# Patient Record
Sex: MALE | Race: WHITE | Employment: FULL TIME | URBAN - METROPOLITAN AREA
[De-identification: names, ages, dates, MRNs, and addresses within clinical notes are randomized per-mention and may not be internally consistent; named-entity substitution may affect disease eponyms.]

---

## 2017-08-16 ENCOUNTER — OFFICE VISIT (OUTPATIENT)
Dept: FAMILY MEDICINE | Facility: CLINIC | Age: 37
End: 2017-08-16
Payer: COMMERCIAL

## 2017-08-16 VITALS
BODY MASS INDEX: 26.99 KG/M2 | HEART RATE: 62 BPM | OXYGEN SATURATION: 100 % | DIASTOLIC BLOOD PRESSURE: 60 MMHG | HEIGHT: 65 IN | TEMPERATURE: 98.5 F | SYSTOLIC BLOOD PRESSURE: 99 MMHG | WEIGHT: 162 LBS

## 2017-08-16 DIAGNOSIS — M75.111 INCOMPLETE ROTATOR CUFF TEAR OR RUPTURE OF RIGHT SHOULDER, NOT SPECIFIED AS TRAUMATIC: ICD-10-CM

## 2017-08-16 DIAGNOSIS — E78.5 HYPERLIPIDEMIA LDL GOAL <100: Primary | ICD-10-CM

## 2017-08-16 PROCEDURE — 99395 PREV VISIT EST AGE 18-39: CPT | Performed by: INTERNAL MEDICINE

## 2017-08-16 NOTE — PROGRESS NOTES
SUBJECTIVE:   CC: Claudio Ramirez is an 37 year old male who presents for preventative health visit.     Physical   Annual:     Getting at least 3 servings of Calcium per day::  Yes    Bi-annual eye exam::  NO    Dental care twice a year::  Yes    Sleep apnea or symptoms of sleep apnea::  None    Taking medications regularly::  Yes    Medication side effects::  None    Additional concerns today::  No            Right shoulder pain x 2 months   New issues with shoulder .. initiaally happened years ago   Doing more work on the computer   Posterior shoudler and to the neck.   More than 6 months   Constant and painful   Working with rehab and therapy   Feels like tearing when throwing.  Used to play baseball   After practice got warm feeling in the area   When cold           Today's PHQ-2 Score:   PHQ-2 ( 1999 Pf[employer]) 8/16/2017   Q1: Little interest or pleasure in doing things 0   Q2: Feeling down, depressed or hopeless 0   PHQ-2 Score 0   Q1: Little interest or pleasure in doing things Not at all   Q2: Feeling down, depressed or hopeless Not at all   PHQ-2 Score 0       Abuse: Current or Past(Physical, Sexual or Emotional)- No  Do you feel safe in your environment - Yes    Social History   Substance Use Topics     Smoking status: Never Smoker     Smokeless tobacco: Never Used     Alcohol use Yes      Comment: 1 beer per week      The patient does not drink >3 drinks per day nor >7 drinks per week.    Last PSA: No results found for: PSA    Reviewed orders with patient. Reviewed health maintenance and updated orders accordingly - Yes  Labs reviewed in EPIC  BP Readings from Last 3 Encounters:   08/16/17 99/60   02/12/16 123/69    Wt Readings from Last 3 Encounters:   08/16/17 162 lb (73.5 kg)   02/12/16 165 lb (74.8 kg)                  Patient Active Problem List   Diagnosis     NO ACTIVE PROBLEMS     Past Surgical History:   Procedure Laterality Date     HC TOOTH EXTRACTION W/FORCEP         Social History  "  Substance Use Topics     Smoking status: Never Smoker     Smokeless tobacco: Never Used     Alcohol use Yes      Comment: 1 beer per week      Family History   Problem Relation Age of Onset     DIABETES Mother          No current outpatient prescriptions on file.           Reviewed and updated as needed this visit by clinical staffTobacco  Allergies  Med Hx  Surg Hx  Fam Hx  Soc Hx        Reviewed and updated as needed this visit by Provider        Past Medical History:   Diagnosis Date     NO ACTIVE PROBLEMS         ROS:  C: NEGATIVE for fever, chills, change in weight  I: NEGATIVE for worrisome rashes, moles or lesions  E: NEGATIVE for vision changes or irritation  ENT: NEGATIVE for ear, mouth and throat problems  R: NEGATIVE for significant cough or SOB  CV: NEGATIVE for chest pain, palpitations or peripheral edema  GI: NEGATIVE for nausea, abdominal pain, heartburn, or change in bowel habits   male: negative for dysuria, hematuria, decreased urinary stream, erectile dysfunction, urethral discharge  M: NEGATIVE for significant arthralgias or myalgia  N: NEGATIVE for weakness, dizziness or paresthesias  P: NEGATIVE for changes in mood or affect    OBJECTIVE:   BP 99/60 (BP Location: Right arm, Patient Position: Chair, Cuff Size: Adult Regular)  Pulse 62  Temp 98.5  F (36.9  C) (Oral)  Ht 5' 5\" (1.651 m)  Wt 162 lb (73.5 kg)  SpO2 100%  BMI 26.96 kg/m2    EXAM:  GENERAL: healthy, alert and no distress  NECK: no adenopathy, no asymmetry, masses, or scars and thyroid normal to palpation  RESP: lungs clear to auscultation - no rales, rhonchi or wheezes  CV: regular rate and rhythm, normal S1 S2, no S3 or S4, no murmur, click or rub, no peripheral edema and peripheral pulses strong  ABDOMEN: soft, nontender, no hepatosplenomegaly, no masses and bowel sounds normal  MS: right shoulder painful but not weak on isolation of rotator cuff  likley partial tear or strain      ASSESSMENT/PLAN:       ICD-10-CM  " "  1. Hyperlipidemia LDL goal <100 E78.5 **Lipid panel reflex to direct LDL FUTURE anytime   2. Incomplete rotator cuff tear or rupture of right shoulder, not specified as traumatic M75.111 MR Shoulder Right w/o Contrast       COUNSELING:   Reviewed preventive health counseling, as reflected in patient instructions       Consider AAA screening for ages 65-75 and smoking history       Regular exercise       Healthy diet/nutrition       Vision screening       Hearing screening       Colon cancer screening         reports that he has never smoked. He has never used smokeless tobacco.      Estimated body mass index is 26.96 kg/(m^2) as calculated from the following:    Height as of this encounter: 5' 5\" (1.651 m).    Weight as of this encounter: 162 lb (73.5 kg).   Weight management plan: Discussed healthy diet and exercise guidelines and patient will follow up in 12 months in clinic to re-evaluate.    ICD-10-CM    1. Hyperlipidemia LDL goal <100 E78.5 **Lipid panel reflex to direct LDL FUTURE anytime   2. Incomplete rotator cuff tear or rupture of right shoulder, not specified as traumatic M75.111 MR Shoulder Right w/o Contrast       Counseling Resources:  ATP IV Guidelines  Pooled Cohorts Equation Calculator  FRAX Risk Assessment  ICSI Preventive Guidelines  Dietary Guidelines for Americans, 2010  USDA's MyPlate  ASA Prophylaxis  Lung CA Screening    Prasanna Scruggs MD  [facility]  Answers for HPI/ROS submitted by the patient on 8/16/2017   PHQ-2 Score: 0    "

## 2017-08-16 NOTE — MR AVS SNAPSHOT
"              After Visit Summary   8/16/2017    Claudio Ramirez    MRN: [id number]           Patient Information     Date Of Birth          1980        Visit Information        Provider Department      8/16/2017 12:00 PM Prasanna Scruggs MD [facility]        Today's Diagnoses     Hyperlipidemia LDL goal <100    -  1    Incomplete rotator cuff tear or rupture of right shoulder, not specified as traumatic           Follow-ups after your visit        Future tests that were ordered for you today     Open Future Orders        Priority Expected Expires Ordered    MR Shoulder Right w/o Contrast Routine  8/16/2018 8/16/2017    **Lipid panel reflex to direct LDL FUTURE anytime Routine 8/16/2017 8/16/2018 8/16/2017            Who to contact     If you have questions or need follow up information about today's clinic visit or your schedule please contact [facility] directly at [phone].  Normal or non-critical lab and imaging results will be communicated to you by MyChart, letter or phone within 4 business days after the clinic has received the results. If you do not hear from us within 7 days, please contact the clinic through [facility] or phone. If you have a critical or abnormal lab result, we will notify you by phone as soon as possible.  Submit refill requests through [email or web address] or call your pharmacy and they will forward the refill request to us. Please allow 3 business days for your refill to be completed.          Additional Information About Your Visit        MyChart Information     [email or web address] lets you send messages to your doctor, view your test results, renew your prescriptions, schedule appointments and more. To sign up, go to [email or web address] . Click on \"Log in\" on the left side of the screen, which will take you to the Welcome page. Then click on \"Sign up Now\" on the right side of the page.     You will be asked to enter the access code listed below, " "as well as some personal information. Please follow the directions to create your username and password.     Your access code is: [email or web address]  Expires: 2017 12:48 PM     Your access code will  in 90 days. If you need help or a new code, please call your [place] clinic or [phone].        Care EveryWhere ID     This is your Care EveryWhere ID. This could be used by other organizations to access your [place] medical records  [id number]        Your Vitals Were     Pulse Temperature Height Pulse Oximetry BMI (Body Mass Index)       62 98.5  F (36.9  C) (Oral) 5' 5\" (1.651 m) 100% 26.96 kg/m2        Blood Pressure from Last 3 Encounters:   17 99/60   16 123/69    Weight from Last 3 Encounters:   17 162 lb (73.5 kg)   16 165 lb (74.8 kg)               Primary Care Provider    None Specified       No primary provider on file.        Equal Access to Services     [facility] : Hadii nellie bell hadasho Soomaali, waaxda luqadaha, qaybta kaalmada adeegyada, ariella shaw . So [facility] [phone].    ATENCIÓN: Si habla español, tiene a lozano disposición servicios gratuitos de asistencia lingüística. Llame al [phone].    We comply with applicable federal civil rights laws and [place] laws. We do not discriminate on the basis of race, color, national origin, age, disability sex, sexual orientation or gender identity.            Thank you!     Thank you for choosing [facility]  for your care. Our goal is always to provide you with excellent care. Hearing back from our patients is one way we can continue to improve our services. Please take a few minutes to complete the written survey that you may receive in the mail after your visit with us. Thank you!             Your Updated Medication List - Protect others around you: Learn how to safely use, store and throw away your medicines at www.disposemymeds.org.      Notice  As of 2017 12:48 " PM    You have not been prescribed any medications.

## 2017-08-16 NOTE — NURSING NOTE
"Chief Complaint   Patient presents with     Physical       Initial BP 99/60 (BP Location: Right arm, Patient Position: Chair, Cuff Size: Adult Regular)  Pulse 62  Temp 98.5  F (36.9  C) (Oral)  Ht 5' 5\" (1.651 m)  Wt 162 lb (73.5 kg)  SpO2 100%  BMI 26.96 kg/m2 Estimated body mass index is 26.96 kg/(m^2) as calculated from the following:    Height as of this encounter: 5' 5\" (1.651 m).    Weight as of this encounter: 162 lb (73.5 kg).  Medication Reconciliation: complete   Mellissa Zhang ma      "

## 2017-08-21 ENCOUNTER — RADIANT APPOINTMENT (OUTPATIENT)
Dept: MRI IMAGING | Facility: CLINIC | Age: 37
End: 2017-08-21
Attending: INTERNAL MEDICINE
Payer: COMMERCIAL

## 2017-08-21 DIAGNOSIS — M75.111 INCOMPLETE ROTATOR CUFF TEAR OR RUPTURE OF RIGHT SHOULDER, NOT SPECIFIED AS TRAUMATIC: ICD-10-CM

## 2017-08-21 PROCEDURE — 73221 MRI JOINT UPR EXTREM W/O DYE: CPT | Mod: TC

## 2018-01-22 ENCOUNTER — TELEPHONE (OUTPATIENT)
Dept: FAMILY MEDICINE | Facility: CLINIC | Age: 38
End: 2018-01-22

## 2018-01-22 NOTE — TELEPHONE ENCOUNTER
Red flag call taken, patient reports he had a bad cold a month ago, the cold went away but he continues to have bad cough.  Denies dizziness, shortness of breath, weakness, or radiating pain.   States the pain is in his left chest, denies difficulty talking, blurry vision, nausea, or weakness.  Denies productive cough or fever.   Denies night sweats or weight loss or recent travel or TB exposure.  States his left ribs hurt more when he pushes on them.    He is talking easily, is not diabetic or a smoker.      Advised he can be seen for cough via office visit, to ER if he is acutely worse, feels dizzy, weak, or having trouble breathing.    Patient verbalized understanding of and agreement with plan.    Source:  Telephone Triage Protocols for Nurses, [place], 5th ed, chest pain    Jyoti Law, RN  [facility]

## 2018-01-23 ENCOUNTER — OFFICE VISIT (OUTPATIENT)
Dept: FAMILY MEDICINE | Facility: CLINIC | Age: 38
End: 2018-01-23
Payer: COMMERCIAL

## 2018-01-23 VITALS
HEIGHT: 65 IN | BODY MASS INDEX: 26.79 KG/M2 | OXYGEN SATURATION: 99 % | WEIGHT: 160.8 LBS | HEART RATE: 102 BPM | TEMPERATURE: 97.7 F | DIASTOLIC BLOOD PRESSURE: 82 MMHG | SYSTOLIC BLOOD PRESSURE: 117 MMHG

## 2018-01-23 DIAGNOSIS — Z13.6 CARDIOVASCULAR SCREENING; LDL GOAL LESS THAN 160: ICD-10-CM

## 2018-01-23 DIAGNOSIS — R05.8 POST-VIRAL COUGH SYNDROME: Primary | ICD-10-CM

## 2018-01-23 DIAGNOSIS — M94.0 COSTOCHONDRITIS: ICD-10-CM

## 2018-01-23 LAB
CHOLEST SERPL-MCNC: 206 MG/DL
HDLC SERPL-MCNC: 52 MG/DL
LDLC SERPL CALC-MCNC: 134 MG/DL
NONHDLC SERPL-MCNC: 154 MG/DL
TRIGL SERPL-MCNC: 102 MG/DL

## 2018-01-23 PROCEDURE — 99213 OFFICE O/P EST LOW 20 MIN: CPT | Performed by: PHYSICIAN ASSISTANT

## 2018-01-23 PROCEDURE — 80061 LIPID PANEL: CPT | Performed by: PHYSICIAN ASSISTANT

## 2018-01-23 PROCEDURE — 36415 COLL VENOUS BLD VENIPUNCTURE: CPT | Performed by: PHYSICIAN ASSISTANT

## 2018-01-23 RX ORDER — BENZONATATE 200 MG/1
200 CAPSULE ORAL 3 TIMES DAILY PRN
Qty: 21 CAPSULE | Refills: 0 | Status: SHIPPED | OUTPATIENT
Start: 2018-01-23 | End: 2018-03-28

## 2018-01-23 RX ORDER — METHYLPREDNISOLONE 4 MG
TABLET, DOSE PACK ORAL
Qty: 21 TABLET | Refills: 0 | Status: SHIPPED | OUTPATIENT
Start: 2018-01-23 | End: 2018-03-28

## 2018-01-23 NOTE — NURSING NOTE
"Chief Complaint   Patient presents with     Cough     Health Maintenance     lipid       Initial /82 (BP Location: Left arm, Patient Position: Chair, Cuff Size: Adult Large)  Pulse 102  Temp 97.7  F (36.5  C) (Oral)  Ht 5' 5\" (1.651 m)  Wt 160 lb 12.8 oz (72.9 kg)  SpO2 99%  BMI 26.76 kg/m2 Estimated body mass index is 26.76 kg/(m^2) as calculated from the following:    Height as of this encounter: 5' 5\" (1.651 m).    Weight as of this encounter: 160 lb 12.8 oz (72.9 kg).  Medication Reconciliation: complete     DARON Marshall MA      "

## 2018-01-23 NOTE — LETTER
[facility]  [address] [place]  [address]  [phone]        January 25, 2018    Claudio Ramirez  [address] [facility] [address]        Dear Claudio,    Your cholesterol looks fine.   Let me know if you have any questions.     Results for orders placed or performed in visit on 01/23/18   Lipid panel reflex to direct LDL Fasting   Result Value Ref Range    Cholesterol 206 (H) <200 mg/dL    Triglycerides 102 <150 mg/dL    HDL Cholesterol 52 >39 mg/dL    LDL Cholesterol Calculated 134 (H) <100 mg/dL    Non HDL Cholesterol 154 (H) <130 mg/dL       If you have any questions please call the clinic at [phone].    Sincerely,    Salome BETANCOURT

## 2018-01-23 NOTE — MR AVS SNAPSHOT
"              After Visit Summary   1/23/2018    Claudio Ramirez    MRN: [id number]           Patient Information     Date Of Birth          1980        Visit Information        Provider Department      1/23/2018 8:40 AM Salome Nichole PA-C [facility]        Today's Diagnoses     Post-viral cough syndrome    -  1    Costochondritis        CARDIOVASCULAR SCREENING; LDL GOAL LESS THAN 160          Care Instructions    Take the steroid, ibuprofen as needed for pain  Tessalon Perles for the cough               Follow-ups after your visit        Follow-up notes from your care team     Return if symptoms worsen or fail to improve, for Routine Visit.      Who to contact     If you have questions or need follow up information about today's clinic visit or your schedule please contact [facility] directly at [phone].  Normal or non-critical lab and imaging results will be communicated to you by MyChart, letter or phone within 4 business days after the clinic has received the results. If you do not hear from us within 7 days, please contact the clinic through MyChart or phone. If you have a critical or abnormal lab result, we will notify you by phone as soon as possible.  Submit refill requests through [employer] or call your pharmacy and they will forward the refill request to us. Please allow 3 business days for your refill to be completed.          Additional Information About Your Visit        MyChart Information     [employer] lets you send messages to your doctor, view your test results, renew your prescriptions, schedule appointments and more. To sign up, go to [email or web address] . Click on \"Log in\" on the left side of the screen, which will take you to the Welcome page. Then click on \"Sign up Now\" on the right side of the page.     You will be asked to enter the access code listed below, as well as some personal information. Please follow the " "directions to create your username and password.     Your access code is: [email or web address]  Expires: 2018  9:22 AM     Your access code will  in 90 days. If you need help or a new code, please call your [place] clinic or [phone].        Care EveryWhere ID     This is your Care EveryWhere ID. This could be used by other organizations to access your [place] medical records  [id number]        Your Vitals Were     Pulse Temperature Height Pulse Oximetry BMI (Body Mass Index)       102 97.7  F (36.5  C) (Oral) 5' 5\" (1.651 m) 99% 26.76 kg/m2        Blood Pressure from Last 3 Encounters:   18 117/82   17 99/60   16 123/69    Weight from Last 3 Encounters:   18 160 lb 12.8 oz (72.9 kg)   17 162 lb (73.5 kg)   16 165 lb (74.8 kg)              We Performed the Following     Lipid panel reflex to direct LDL Fasting          Today's Medication Changes          These changes are accurate as of: 18  9:22 AM.  If you have any questions, ask your nurse or doctor.               Start taking these medicines.        Dose/Directions    benzonatate 200 MG capsule   Commonly known as:  TESSALON   Used for:  Post-viral cough syndrome   Started by:  Salome Nichole PA-C        Dose:  200 mg   Take 1 capsule (200 mg) by mouth 3 times daily as needed for cough   Quantity:  21 capsule   Refills:  0       methylPREDNISolone 4 MG tablet   Commonly known as:  MEDROL DOSEPAK   Used for:  Post-viral cough syndrome, Costochondritis   Started by:  Salome Nichole PA-C        Follow package instructions   Quantity:  21 tablet   Refills:  0            Where to get your medicines      These medications were sent to [facility] [place] - [place], [place] - [address]  [address], [facility] [address]     Phone:  [phone]     benzonatate 200 MG capsule    methylPREDNISolone 4 MG tablet                Primary Care Provider Office " Phone # Fax #    Prasanna Scruggs -[phone]       [address] [facility] [address]        Equal Access to Services     KRAIG HUNT : Hadii aad ku haddmitriykin Alvarado, wajerseyda leoneldesireeha, qathomasta kaprimoda bhakti, ariella ambrose khrisnathalia mckeon colin manzo. So [facility] [phone].    ATENCIÓN: Si habla español, tiene a lozano disposición servicios gratuitos de asistencia lingüística. Llame al [phone].    We comply with applicable federal civil rights laws and [place] laws. We do not discriminate on the basis of race, color, national origin, age, disability, sex, sexual orientation, or gender identity.            Thank you!     Thank you for choosing [facility]  for your care. Our goal is always to provide you with excellent care. Hearing back from our patients is one way we can continue to improve our services. Please take a few minutes to complete the written survey that you may receive in the mail after your visit with us. Thank you!             Your Updated Medication List - Protect others around you: Learn how to safely use, store and throw away your medicines at www.disposemymeds.org.          This list is accurate as of: 1/23/18  9:22 AM.  Always use your most recent med list.                   Brand Name Dispense Instructions for use Diagnosis    benzonatate 200 MG capsule    TESSALON    21 capsule    Take 1 capsule (200 mg) by mouth 3 times daily as needed for cough    Post-viral cough syndrome       methylPREDNISolone 4 MG tablet    MEDROL DOSEPAK    21 tablet    Follow package instructions    Post-viral cough syndrome, Costochondritis

## 2018-01-23 NOTE — PROGRESS NOTES
"  SUBJECTIVE:   Claudio Ramirez is a 37 year old male who presents to clinic today for the following health issues:      Concern - Cough  Onset: 4 weeks    Description:   Pt stated that he is still coughing. Pt stated he is having a non-productive cough. He said his nose and lungs are cleared up. He stated that he is not having any troubles sleeping due to his cough.    Intensity: mild, moderate    Progression of Symptoms:  same and waxing and waning    Accompanying Signs & Symptoms:  non    Previous history of similar problem:   none    Precipitating factors:   Worsened by: When pt goes outside he notices that if he goes inside to outside that is when his cough starts to act up again.     Alleviating factors:  Improved by: drinking tea and water    Therapies Tried and outcome: OTC cough medicine helps only a little  Has left sided rib pain with cough           Problem list and histories reviewed & adjusted, as indicated.  Additional history: as documented    Patient Active Problem List   Diagnosis     NO ACTIVE PROBLEMS     CARDIOVASCULAR SCREENING; LDL GOAL LESS THAN 160     Past Surgical History:   Procedure Laterality Date     HC TOOTH EXTRACTION W/FORCEP         Social History   Substance Use Topics     Smoking status: Never Smoker     Smokeless tobacco: Never Used     Alcohol use Yes      Comment: 1 beer per week      Family History   Problem Relation Age of Onset     DIABETES Mother              Reviewed and updated as needed this visit by clinical staffTobacco  Allergies  Meds  Med Hx  Surg Hx  Fam Hx  Soc Hx      Reviewed and updated as needed this visit by Provider  Allergies  Meds         ROS:  As above    OBJECTIVE:     /82 (BP Location: Left arm, Patient Position: Chair, Cuff Size: Adult Large)  Pulse 102  Temp 97.7  F (36.5  C) (Oral)  Ht 5' 5\" (1.651 m)  Wt 160 lb 12.8 oz (72.9 kg)  SpO2 99%  BMI 26.76 kg/m2  Body mass index is 26.76 kg/(m^2).  GENERAL: healthy, alert and no " distress  HENT: ear canals and TM's normal, oropharynx clear and oral mucous membranes moist  NECK: no adenopathy and no asymmetry, masses, or scars  RESP: lungs clear to auscultation - no rales, rhonchi or wheezes  CV: regular rates and rhythm, normal S1 S2, no S3 or S4 and no murmur, click or rub, pain on palpation of left ribs just under breast     Diagnostic Test Results:  none     ASSESSMENT/PLAN:       1. Post-viral cough syndrome  Reassured pt.  Will do prednisone due to pain.  Follow up if he gets fever or symptoms worsen.     - methylPREDNISolone (MEDROL DOSEPAK) 4 MG tablet; Follow package instructions  Dispense: 21 tablet; Refill: 0  - benzonatate (TESSALON) 200 MG capsule; Take 1 capsule (200 mg) by mouth 3 times daily as needed for cough  Dispense: 21 capsule; Refill: 0    2. Costochondritis  From coughing.  Encouraged ibuprofen as needed for pain.    - methylPREDNISolone (MEDROL DOSEPAK) 4 MG tablet; Follow package instructions  Dispense: 21 tablet; Refill: 0    3. CARDIOVASCULAR SCREENING; LDL GOAL LESS THAN 160    - Lipid panel reflex to direct LDL Fasting    Patient Instructions   Take the steroid, ibuprofen as needed for pain  Tessalon Perles for the cough           Salome Nichole PA-C  [facility]

## 2018-03-28 ENCOUNTER — OFFICE VISIT (OUTPATIENT)
Dept: FAMILY MEDICINE | Facility: CLINIC | Age: 38
End: 2018-03-28
Payer: COMMERCIAL

## 2018-03-28 VITALS
OXYGEN SATURATION: 98 % | DIASTOLIC BLOOD PRESSURE: 67 MMHG | HEART RATE: 85 BPM | WEIGHT: 166 LBS | BODY MASS INDEX: 27.62 KG/M2 | TEMPERATURE: 97.9 F | SYSTOLIC BLOOD PRESSURE: 117 MMHG

## 2018-03-28 DIAGNOSIS — Z00.00 ENCOUNTER FOR ROUTINE ADULT HEALTH EXAMINATION WITHOUT ABNORMAL FINDINGS: Primary | ICD-10-CM

## 2018-03-28 PROCEDURE — 99395 PREV VISIT EST AGE 18-39: CPT | Performed by: INTERNAL MEDICINE

## 2018-03-28 ASSESSMENT — PAIN SCALES - GENERAL: PAINLEVEL: NO PAIN (0)

## 2018-03-28 NOTE — PROGRESS NOTES
SUBJECTIVE:   CC: Claudio Ramirez is an 37 year old male who presents for preventative health visit.     Physical   Annual:     Getting at least 3 servings of Calcium per day::  Yes    Bi-annual eye exam::  NO    Dental care twice a year::  Yes    Sleep apnea or symptoms of sleep apnea::  None    Diet::  Regular (no restrictions)    Frequency of exercise::  None    Taking medications regularly::  Yes    Medication side effects::  None    Additional concerns today::  No            MRI shoulder was gone at that time.   Stomach stable   Snoring present when sleeping   Snoring has been louder   Concern if something long                   Today's PHQ-2 Score:   PHQ-2 ( 1999 [employer]) 3/28/2018   Q1: Little interest or pleasure in doing things 0   Q2: Feeling down, depressed or hopeless 0   PHQ-2 Score 0   Q1: Little interest or pleasure in doing things Not at all   Q2: Feeling down, depressed or hopeless Not at all   PHQ-2 Score 0       Abuse: Current or Past(Physical, Sexual or Emotional)- No  Do you feel safe in your environment - Yes    Social History   Substance Use Topics     Smoking status: Never Smoker     Smokeless tobacco: Never Used     Alcohol use Yes      Comment: 1 beer per week      Alcohol Use 3/28/2018   If you drink alcohol do you typically have greater than 3 drinks per day OR greater than 7 drinks per week? Not Applicable   No flowsheet data found.    Last PSA: No results found for: PSA    Reviewed orders with patient. Reviewed health maintenance and updated orders accordingly - Yes  Labs reviewed in EPIC  BP Readings from Last 3 Encounters:   03/28/18 117/67   01/23/18 117/82   08/16/17 99/60    Wt Readings from Last 3 Encounters:   03/28/18 166 lb (75.3 kg)   01/23/18 160 lb 12.8 oz (72.9 kg)   08/16/17 162 lb (73.5 kg)                  Patient Active Problem List   Diagnosis     NO ACTIVE PROBLEMS     CARDIOVASCULAR SCREENING; LDL GOAL LESS THAN 160     Past Surgical History:   Procedure  Laterality Date     HC TOOTH EXTRACTION W/FORCEP         Social History   Substance Use Topics     Smoking status: Never Smoker     Smokeless tobacco: Never Used     Alcohol use Yes      Comment: 1 beer per week      Family History   Problem Relation Age of Onset     DIABETES Mother          No current outpatient prescriptions on file.       Reviewed and updated as needed this visit by clinical staff  Tobacco  Allergies  Meds  Med Hx  Surg Hx  Fam Hx  Soc Hx        Reviewed and updated as needed this visit by Provider            Review of Systems  C: NEGATIVE for fever, chills, change in weight  I: NEGATIVE for worrisome rashes, moles or lesions  E: NEGATIVE for vision changes or irritation  ENT: NEGATIVE for ear, mouth and throat problems  R: NEGATIVE for significant cough or SOB  CV: NEGATIVE for chest pain, palpitations or peripheral edema  GI: NEGATIVE for nausea, abdominal pain, heartburn, or change in bowel habits   male: negative for dysuria, hematuria, decreased urinary stream, erectile dysfunction, urethral discharge  M: NEGATIVE for significant arthralgias or myalgia  N: NEGATIVE for weakness, dizziness or paresthesias  P: NEGATIVE for changes in mood or affect    OBJECTIVE:   /67 (BP Location: Right arm, Patient Position: Chair, Cuff Size: Adult Regular)  Pulse 85  Temp 97.9  F (36.6  C) (Oral)  Wt 166 lb (75.3 kg)  SpO2 98%  BMI 27.62 kg/m2    Physical Exam  GENERAL: healthy, alert and no distress  NECK: no adenopathy, no asymmetry, masses, or scars and thyroid normal to palpation  RESP: lungs clear to auscultation - no rales, rhonchi or wheezes  CV: regular rate and rhythm, normal S1 S2, no S3 or S4, no murmur, click or rub, no peripheral edema and peripheral pulses strong  ABDOMEN: soft, nontender, no hepatosplenomegaly, no masses and bowel sounds normal  MS: no gross musculoskeletal defects noted, no edema    ASSESSMENT/PLAN:       ICD-10-CM    1. Encounter for routine adult health  "examination without abnormal findings Z00.00        COUNSELING:   Reviewed preventive health counseling, as reflected in patient instructions       Regular exercise       Healthy diet/nutrition       Vision screening         reports that he has never smoked. He has never used smokeless tobacco.    Estimated body mass index is 27.62 kg/(m^2) as calculated from the following:    Height as of 1/23/18: 5' 5\" (1.651 m).    Weight as of this encounter: 166 lb (75.3 kg).   Weight management plan: Discussed healthy diet and exercise guidelines and patient will follow up in 12 months in clinic to re-evaluate.    ICD-10-CM    1. Encounter for routine adult health examination without abnormal findings Z00.00        Counseling Resources:  ATP IV Guidelines  Pooled Cohorts Equation Calculator  FRAX Risk Assessment  ICSI Preventive Guidelines  Dietary Guidelines for Americans, 2010  USDA's MyPlate  ASA Prophylaxis  Lung CA Screening    Prasanna Scruggs MD  [facility]  "

## 2018-03-28 NOTE — NURSING NOTE
"Chief Complaint   Patient presents with     Physical       Initial /67 (BP Location: Right arm, Patient Position: Chair, Cuff Size: Adult Regular)  Pulse 85  Temp 97.9  F (36.6  C) (Oral)  Wt 166 lb (75.3 kg)  SpO2 98%  BMI 27.62 kg/m2 Estimated body mass index is 27.62 kg/(m^2) as calculated from the following:    Height as of 1/23/18: 5' 5\" (1.651 m).    Weight as of this encounter: 166 lb (75.3 kg).  Medication Reconciliation: complete   Susana Harman MA      "

## 2018-03-28 NOTE — PATIENT INSTRUCTIONS
Consider sleep study if worsening, if stopping breathing for more than 10 seconds while snoring.  Or increasing daytime sleepiness, will do a sleep study at that time.

## 2018-03-28 NOTE — MR AVS SNAPSHOT
"              After Visit Summary   3/28/2018    Claudio Ramirez    MRN: [id number]           Patient Information     Date Of Birth          1980        Visit Information        Provider Department      3/28/2018 5:00 PM Prasanna Scruggs MD [facility]        Care Instructions    Consider sleep study if worsening, if stopping breathing for more than 10 seconds while snoring.  Or increasing daytime sleepiness, will do a sleep study at that time.              Follow-ups after your visit        Who to contact     If you have questions or need follow up information about today's clinic visit or your schedule please contact [facility] directly at [phone].  Normal or non-critical lab and imaging results will be communicated to you by MyChart, letter or phone within 4 business days after the clinic has received the results. If you do not hear from us within 7 days, please contact the clinic through MyChart or phone. If you have a critical or abnormal lab result, we will notify you by phone as soon as possible.  Submit refill requests through [employer] or call your pharmacy and they will forward the refill request to us. Please allow 3 business days for your refill to be completed.          Additional Information About Your Visit        MyChart Information     Woto lets you send messages to your doctor, view your test results, renew your prescriptions, schedule appointments and more. To sign up, go to [email or web address] . Click on \"Log in\" on the left side of the screen, which will take you to the Welcome page. Then click on \"Sign up Now\" on the right side of the page.     You will be asked to enter the access code listed below, as well as some personal information. Please follow the directions to create your username and password.     Your access code is: [email or web address]  Expires: 2018 10:22 AM     Your access code will  in 90 days. If you need " help or a new code, please call your [facility] clinic or [phone].        Care EveryWhere ID     This is your Care EveryWhere ID. This could be used by other organizations to access your [facility] medical records  [id number]        Your Vitals Were     Pulse Temperature Pulse Oximetry BMI (Body Mass Index)          85 97.9  F (36.6  C) (Oral) 98% 27.62 kg/m2         Blood Pressure from Last 3 Encounters:   03/28/18 117/67   01/23/18 117/82   08/16/17 99/60    Weight from Last 3 Encounters:   03/28/18 166 lb (75.3 kg)   01/23/18 160 lb 12.8 oz (72.9 kg)   08/16/17 162 lb (73.5 kg)              Today, you had the following     No orders found for display       Primary Care Provider Office Phone # Fax #    Prasanna Scruggs [phone]       [address] [place], [place]. \[facility] Name and Outlook.\"" [id number]        Equal Access to Services     KRAIG HUNT : Hadii aad ku hadasho Soomaali, waaxda luqadaha, qaybta kaalmada adeegyada, waxay idiin haykatien wilmer shaw . So [facility] [phone].    ATENCIÓN: Si habla español, tiene a lozano disposición servicios gratuitos de asistencia lingüística. Llame al [phone].    We comply with applicable federal civil rights laws and [place] laws. We do not discriminate on the basis of race, color, national origin, age, disability, sex, sexual orientation, or gender identity.            Thank you!     Thank you for choosing [facility]  for your care. Our goal is always to provide you with excellent care. Hearing back from our patients is one way we can continue to improve our services. Please take a few minutes to complete the written survey that you may receive in the mail after your visit with us. Thank you!             Your Updated Medication List - Protect others around you: Learn how to safely use, store and throw away your medicines at www.disposemymeds.org.      Notice  As of 3/28/2018  6:00 PM    You have not been prescribed any medications.

## 2018-11-16 ENCOUNTER — ALLIED HEALTH/NURSE VISIT (OUTPATIENT)
Dept: NURSING | Facility: CLINIC | Age: 38
End: 2018-11-16
Payer: COMMERCIAL

## 2018-11-16 DIAGNOSIS — Z23 NEED FOR PROPHYLACTIC VACCINATION AND INOCULATION AGAINST INFLUENZA: Primary | ICD-10-CM

## 2018-11-16 PROCEDURE — 99207 ZZC NO CHARGE NURSE ONLY: CPT

## 2018-11-16 PROCEDURE — 90686 IIV4 VACC NO PRSV 0.5 ML IM: CPT

## 2018-11-16 PROCEDURE — 90471 IMMUNIZATION ADMIN: CPT

## 2018-11-16 NOTE — PROGRESS NOTES
Injectable Influenza Immunization Documentation    1.  Is the person to be vaccinated sick today?   No    2. Does the person to be vaccinated have an allergy to a component   of the vaccine?   No  Egg Allergy Algorithm Link    3. Has the person to be vaccinated ever had a serious reaction   to influenza vaccine in the past?   No    4. Has the person to be vaccinated ever had Guillain-Barré syndrome?   No    Form completed by Patient.  SALAZAR Valentin MA  Patient/or Parent of Patient instructed to wait 15 minutes after injection in the case of a allergic reaction.  Prior to injection verified patient identity using patient's name and date of birth.  Due to injection administration, patient instructed to remain in clinic for 15 minutes  afterwards, and to report any adverse reaction to me immediately.

## 2018-11-16 NOTE — MR AVS SNAPSHOT
After Visit Summary   11/16/2018    Claudio Ramirez    MRN: [id number]           Patient Information     Date Of Birth          1980        Visit Information        Provider Department      11/16/2018 2:00 PM CARE COORDINATOR CP [facility]        Today's Diagnoses     Need for prophylactic vaccination and inoculation against influenza    -  1       Follow-ups after your visit        Who to contact     If you have questions or need follow up information about today's clinic visit or your schedule please contact [facility] directly at [phone].  Normal or non-critical lab and imaging results will be communicated to you by Vhotohart, letter or phone within 4 business days after the clinic has received the results. If you do not hear from us within 7 days, please contact the clinic through [employer] or phone. If you have a critical or abnormal lab result, we will notify you by phone as soon as possible.  Submit refill requests through [employer] or call your pharmacy and they will forward the refill request to us. Please allow 3 business days for your refill to be completed.          Additional Information About Your Visit        MyChart Information     [employer] gives you secure access to your electronic health record. If you see a primary care provider, you can also send messages to your care team and make appointments. If you have questions, please call your primary care clinic.  If you do not have a primary care provider, please call [phone] and they will assist you.        Care EveryWhere ID     This is your Care EveryWhere ID. This could be used by other organizations to access your [facility] medical records  [id number]         Blood Pressure from Last 3 Encounters:   03/28/18 117/67   01/23/18 117/82   08/16/17 99/60    Weight from Last 3 Encounters:   03/28/18 166 lb (75.3 kg)   01/23/18 160 lb 12.8 oz (72.9 kg)   08/16/17 162 lb (73.5 kg)               We Performed the Following     FLU VACCINE, SPLIT VIRUS, IM (QUADRIVALENT) [91814]- >3 YRS     Vaccine Administration, Initial [41474]        Primary Care Provider Office Phone # Fax #    Prasanna Scruggs [phone]       [address] [facility] [address]        Equal Access to Services     KRAIG HUNT : Hadii aad ku hadasho Soomaali, waaxda luqadaha, qaybta kaalmada adeegyada, ariella sahu haykatien wilmer shaw . So [facility] [phone].    ATENCIÓN: Si habla español, tiene a lozano disposición servicios gratuitos de asistencia lingüística. Llame al [phone].    We comply with applicable federal civil rights laws and [place] laws. We do not discriminate on the basis of race, color, national origin, age, disability, sex, sexual orientation, or gender identity.            Thank you!     Thank you for choosing [facility]  for your care. Our goal is always to provide you with excellent care. Hearing back from our patients is one way we can continue to improve our services. Please take a few minutes to complete the written survey that you may receive in the mail after your visit with us. Thank you!             Your Updated Medication List - Protect others around you: Learn how to safely use, store and throw away your medicines at www.disposemymeds.org.      Notice  As of 11/16/2018  2:21 PM    You have not been prescribed any medications.

## 2020-03-10 ENCOUNTER — HEALTH MAINTENANCE LETTER (OUTPATIENT)
Age: 40
End: 2020-03-10

## 2020-11-19 ENCOUNTER — ALLIED HEALTH/NURSE VISIT (OUTPATIENT)
Dept: NURSING | Facility: CLINIC | Age: 40
End: 2020-11-19
Payer: COMMERCIAL

## 2020-11-19 DIAGNOSIS — Z23 NEED FOR PROPHYLACTIC VACCINATION AND INOCULATION AGAINST INFLUENZA: Primary | ICD-10-CM

## 2020-11-19 PROCEDURE — 90686 IIV4 VACC NO PRSV 0.5 ML IM: CPT

## 2020-11-19 PROCEDURE — 99207 PR NO CHARGE NURSE ONLY: CPT

## 2020-11-19 PROCEDURE — 90471 IMMUNIZATION ADMIN: CPT

## 2021-04-24 ENCOUNTER — HEALTH MAINTENANCE LETTER (OUTPATIENT)
Age: 41
End: 2021-04-24

## 2021-10-04 ENCOUNTER — HEALTH MAINTENANCE LETTER (OUTPATIENT)
Age: 41
End: 2021-10-04

## 2022-05-15 ENCOUNTER — HEALTH MAINTENANCE LETTER (OUTPATIENT)
Age: 42
End: 2022-05-15

## 2022-09-11 ENCOUNTER — HEALTH MAINTENANCE LETTER (OUTPATIENT)
Age: 42
End: 2022-09-11

## 2023-06-03 ENCOUNTER — HEALTH MAINTENANCE LETTER (OUTPATIENT)
Age: 43
End: 2023-06-03